# Patient Record
Sex: FEMALE | Race: WHITE | URBAN - METROPOLITAN AREA
[De-identification: names, ages, dates, MRNs, and addresses within clinical notes are randomized per-mention and may not be internally consistent; named-entity substitution may affect disease eponyms.]

---

## 2019-11-06 ENCOUNTER — Encounter (OUTPATIENT)
Dept: URBAN - METROPOLITAN AREA CLINIC 71 | Facility: CLINIC | Age: 64
End: 2019-11-06
Payer: COMMERCIAL

## 2019-11-06 PROCEDURE — 92014 COMPRE OPH EXAM EST PT 1/>: CPT | Performed by: OPTOMETRIST

## 2019-11-06 PROCEDURE — 92250 FUNDUS PHOTOGRAPHY W/I&R: CPT | Performed by: OPTOMETRIST

## 2020-06-09 ENCOUNTER — OFFICE VISIT (OUTPATIENT)
Dept: URBAN - METROPOLITAN AREA CLINIC 71 | Facility: CLINIC | Age: 65
End: 2020-06-09
Payer: COMMERCIAL

## 2020-06-09 DIAGNOSIS — H25.13 NUCLEAR SCLEROSIS CATARACT, BILATERAL: ICD-10-CM

## 2020-06-09 DIAGNOSIS — H52.4 PRESBYOPIA: Primary | ICD-10-CM

## 2020-06-09 DIAGNOSIS — H04.123 DRY EYE SYNDROME OF BILATERAL LACRIMAL GLANDS: ICD-10-CM

## 2020-06-09 PROCEDURE — 92014 COMPRE OPH EXAM EST PT 1/>: CPT | Performed by: OPTOMETRIST

## 2020-06-09 ASSESSMENT — INTRAOCULAR PRESSURE
OD: 14
OS: 14

## 2020-06-09 ASSESSMENT — VISUAL ACUITY
OS: 20/20
OD: 20/20

## 2020-06-09 NOTE — IMPRESSION/PLAN
Impression: Presbyopia: H52.4. PAL nor readers not working well on increased computer hourse since [address]: A glasses prescription has been discussed and generated. Patient to call with any concerns. Advise [employer].

## 2020-06-09 NOTE — IMPRESSION/PLAN
Impression: Dry eye syndrome of bilateral lacrimal glands: H04.123. blur after extended reading Plan: discussed. blink more frequently during extended near work. ATs PRN.

## 2021-07-21 ENCOUNTER — OFFICE VISIT (OUTPATIENT)
Dept: URBAN - METROPOLITAN AREA CLINIC 71 | Facility: CLINIC | Age: 66
End: 2021-07-21
Payer: MEDICARE

## 2021-07-21 PROCEDURE — 99212 OFFICE O/P EST SF 10 MIN: CPT | Performed by: OPTOMETRIST

## 2021-07-21 ASSESSMENT — VISUAL ACUITY
OS: 20/20
OD: 20/20

## 2021-07-21 ASSESSMENT — INTRAOCULAR PRESSURE
OD: 20
OS: 18

## 2021-07-21 NOTE — IMPRESSION/PLAN
Impression: Presbyopia: H52.4. Plan: Presbyopia is the inability to focus on objects (ie: accommodate) due to the loss of flexibility of your natural lens. Presbyopia occurs with age. Reading glasses, bifocals, trifocals or contacts can be helpful. Contact the office if difficulty focusing persists despite corrective eye wear. New glasses RX given today for progressives and Computer glasses Continue to follow annually

## 2021-07-21 NOTE — IMPRESSION/PLAN
Impression: Nuclear sclerosis cataract, bilateral: H25.13. Plan: Cataracts are mild OU. No treatment currently recommended. The patient will monitor vision changes and contact us with any decrease in vision.

## 2022-08-24 ENCOUNTER — OFFICE VISIT (OUTPATIENT)
Dept: URBAN - METROPOLITAN AREA CLINIC 71 | Facility: CLINIC | Age: 67
End: 2022-08-24
Payer: MEDICARE

## 2022-08-24 DIAGNOSIS — H43.813 VITREOUS DEGENERATION, BILATERAL: ICD-10-CM

## 2022-08-24 DIAGNOSIS — H25.13 NUCLEAR SCLEROSIS CATARACT, BILATERAL: Primary | ICD-10-CM

## 2022-08-24 DIAGNOSIS — H52.4 PRESBYOPIA: ICD-10-CM

## 2022-08-24 PROCEDURE — 99214 OFFICE O/P EST MOD 30 MIN: CPT | Performed by: OPTOMETRIST

## 2022-08-24 ASSESSMENT — VISUAL ACUITY
OD: 20/20
OS: 20/20

## 2022-08-24 ASSESSMENT — INTRAOCULAR PRESSURE
OS: 11
OD: 12

## 2022-08-24 NOTE — IMPRESSION/PLAN
Impression: Presbyopia: H52.4. Plan: Presbyopia is the inability to focus on objects (ie: accommodate) due to the loss of flexibility of your natural lens. Presbyopia occurs with age. Reading glasses, bifocals, trifocals or contacts can be helpful. Contact the office if difficulty focusing persists despite corrective eye wear. New glasses RX given today for progressives. Changes in the prescription discussed compared to her old glasses.

## 2023-08-16 ENCOUNTER — OFFICE VISIT (OUTPATIENT)
Dept: URBAN - METROPOLITAN AREA CLINIC 71 | Facility: CLINIC | Age: 68
End: 2023-08-16
Payer: MEDICARE

## 2023-08-16 DIAGNOSIS — H52.4 PRESBYOPIA: ICD-10-CM

## 2023-08-16 DIAGNOSIS — H43.813 VITREOUS DEGENERATION, BILATERAL: Primary | ICD-10-CM

## 2023-08-16 DIAGNOSIS — H25.13 NUCLEAR SCLEROSIS CATARACT, BILATERAL: ICD-10-CM

## 2023-08-16 PROCEDURE — 99213 OFFICE O/P EST LOW 20 MIN: CPT | Performed by: OPTOMETRIST

## 2023-08-16 ASSESSMENT — INTRAOCULAR PRESSURE
OS: 12
OD: 17

## 2024-08-20 ENCOUNTER — OFFICE VISIT (OUTPATIENT)
Dept: URBAN - METROPOLITAN AREA CLINIC 71 | Facility: CLINIC | Age: 69
End: 2024-08-20
Payer: MEDICARE

## 2024-08-20 DIAGNOSIS — H43.813 VITREOUS DEGENERATION, BILATERAL: Primary | ICD-10-CM

## 2024-08-20 DIAGNOSIS — H52.4 PRESBYOPIA: ICD-10-CM

## 2024-08-20 DIAGNOSIS — H25.13 AGE-RELATED NUCLEAR CATARACT, BILATERAL: ICD-10-CM

## 2024-08-20 DIAGNOSIS — H35.40 PERIPHERAL RETINAL DEGENERATION: ICD-10-CM

## 2024-08-20 PROCEDURE — 99213 OFFICE O/P EST LOW 20 MIN: CPT | Performed by: OPTOMETRIST

## 2024-08-20 ASSESSMENT — INTRAOCULAR PRESSURE
OD: 16
OS: 16

## 2024-08-20 ASSESSMENT — VISUAL ACUITY
OS: 20/20
OD: 20/20